# Patient Record
(demographics unavailable — no encounter records)

---

## 2025-02-20 NOTE — REVIEW OF SYSTEMS
[Negative] : Heme/Lymph [Joint Pain] : no joint pain [Joint Stiffness] : no joint stiffness [Joint Swelling] : no joint swelling [Muscle Weakness] : no muscle weakness [Muscle Pain] : no muscle pain [Back Pain] : no back pain

## 2025-02-20 NOTE — HISTORY OF PRESENT ILLNESS
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  not taking supplementation.   Takes nasonex for the allergies.  taking zyrtec this year Shoulder has been mostly better.   no noted urinary sx. no other c/o.  vaccines - not getting flu Had covid x2 TDAP - ? overdue.  gyn - 3/24 mammo - 11/23 - due colon - due to start

## 2025-02-20 NOTE — HEALTH RISK ASSESSMENT
[Yes] : Yes [Monthly or less (1 pt)] : Monthly or less (1 point) [1 or 2 (0 pts)] : 1 or 2 (0 points) [Never (0 pts)] : Never (0 points) [No] : In the past 12 months have you used drugs other than those required for medical reasons? No [0] : 2) Feeling down, depressed, or hopeless: Not at all (0) [PHQ-2 Negative - No further assessment needed] : PHQ-2 Negative - No further assessment needed [Never] : Never [Audit-CScore] : 1 [[id number]] : 0